# Patient Record
Sex: MALE | URBAN - METROPOLITAN AREA
[De-identification: names, ages, dates, MRNs, and addresses within clinical notes are randomized per-mention and may not be internally consistent; named-entity substitution may affect disease eponyms.]

---

## 2021-09-29 ENCOUNTER — OFFICE VISIT (OUTPATIENT)
Dept: URBAN - METROPOLITAN AREA CLINIC 19 | Facility: CLINIC | Age: 31
End: 2021-09-29

## 2021-09-29 ENCOUNTER — OFFICE VISIT (OUTPATIENT)
Dept: URBAN - METROPOLITAN AREA CLINIC 19 | Facility: CLINIC | Age: 31
End: 2021-09-29
Payer: COMMERCIAL

## 2021-09-29 ENCOUNTER — TELEPHONE ENCOUNTER (OUTPATIENT)
Dept: URBAN - METROPOLITAN AREA CLINIC 19 | Facility: CLINIC | Age: 31
End: 2021-09-29

## 2021-09-29 ENCOUNTER — LAB OUTSIDE AN ENCOUNTER (OUTPATIENT)
Dept: URBAN - METROPOLITAN AREA CLINIC 19 | Facility: CLINIC | Age: 31
End: 2021-09-29

## 2021-09-29 DIAGNOSIS — K21.9 GASTROESOPHAGEAL REFLUX DISEASE, UNSPECIFIED WHETHER ESOPHAGITIS PRESENT: ICD-10-CM

## 2021-09-29 DIAGNOSIS — R10.84 GENERALIZED ABDOMINAL PAIN: ICD-10-CM

## 2021-09-29 DIAGNOSIS — R19.7 DIARRHEA, UNSPECIFIED TYPE: ICD-10-CM

## 2021-09-29 PROCEDURE — 99244 OFF/OP CNSLTJ NEW/EST MOD 40: CPT | Performed by: NURSE PRACTITIONER

## 2021-09-29 NOTE — PHYSICAL EXAM HENT:
Head,  normocephalic,  atraumatic,  Face,  Face within normal limits,  Ears,  External ears within normal limits,  Nose/Nasopharynx,  External nose  normal appearance,  no nasal discharge,  Mouth and Throat,  Oral cavity appearance normal Lips,  Appearance normal
Skin normal color for race, warm, dry and intact. No evidence of rash.

## 2021-09-29 NOTE — HPI-GERD
Mr. Buck is a 30 year-old male who presents today for generalized abdominal pain, diarrhea, and reflux x3 months.  He reports his abdominal pain is worse with certain foods and gets better with bowel movements.  He reports it feels like severe cramping.  His diarrhea has gotten a little bit better over the last couple weeks.  He reports at the beginning he was always in the bathroom.  Now he reports he is going at least 4 times daily.  He reports his stool looks oily.  He denies blood in mucus.  No previous stool studies.  He reports his PCP put him on antibiotics 2 to 3 months ago to see if it would help with his diarrhea.  He is unsure if it is ciprofloxacin or amoxicillin.  He reports he saw no improvement.  He currently is taking omeprazole once a day and sucralfate twice a day for his reflux, which she has been on for 2 months.  He reports some improvement.  Some blood seen in the liquid he is refluxing.  He also reports chills that happen occasionally along with fatigue.  He has been eating a bland diet.  No weight loss.  Endorses nausea.  Had recent blood work with PCP that he reports was normal.  He reports he is on any medications besides the ones mentioned above.  Takes ibuprofen every 3 days for back pain.  He denies cardiac/kidney disease, prescription blood thinners, diabetes, and home O2. No previous EGD/colonoscopy.

## 2021-09-30 PROBLEM — 102614006: Status: ACTIVE | Noted: 2021-09-29

## 2021-09-30 PROBLEM — 62315008: Status: ACTIVE | Noted: 2021-09-29

## 2021-09-30 PROBLEM — 235595009: Status: ACTIVE | Noted: 2021-09-29

## 2021-10-02 ENCOUNTER — LAB OUTSIDE AN ENCOUNTER (OUTPATIENT)
Dept: URBAN - METROPOLITAN AREA CLINIC 19 | Facility: CLINIC | Age: 31
End: 2021-10-02

## 2021-10-04 ENCOUNTER — OFFICE VISIT (OUTPATIENT)
Dept: URBAN - METROPOLITAN AREA LAB 2 | Facility: LAB | Age: 31
End: 2021-10-04
Payer: COMMERCIAL

## 2021-10-04 DIAGNOSIS — R19.7 ACUTE DIARRHEA: ICD-10-CM

## 2021-10-04 DIAGNOSIS — K29.60 ADENOPAPILLOMATOSIS GASTRICA: ICD-10-CM

## 2021-10-04 PROCEDURE — 45380 COLONOSCOPY AND BIOPSY: CPT | Performed by: INTERNAL MEDICINE

## 2021-10-04 PROCEDURE — 43239 EGD BIOPSY SINGLE/MULTIPLE: CPT | Performed by: INTERNAL MEDICINE

## 2021-10-11 LAB — GASTROINTESTINAL PATHOGEN: (no result)
